# Patient Record
Sex: FEMALE | Race: WHITE | NOT HISPANIC OR LATINO
[De-identification: names, ages, dates, MRNs, and addresses within clinical notes are randomized per-mention and may not be internally consistent; named-entity substitution may affect disease eponyms.]

---

## 2017-10-30 ENCOUNTER — APPOINTMENT (OUTPATIENT)
Dept: ORTHOPEDIC SURGERY | Facility: CLINIC | Age: 67
End: 2017-10-30
Payer: MEDICARE

## 2017-10-30 VITALS — SYSTOLIC BLOOD PRESSURE: 152 MMHG | DIASTOLIC BLOOD PRESSURE: 81 MMHG | HEART RATE: 87 BPM

## 2017-10-30 PROCEDURE — 72100 X-RAY EXAM L-S SPINE 2/3 VWS: CPT

## 2017-10-30 PROCEDURE — 99214 OFFICE O/P EST MOD 30 MIN: CPT

## 2018-01-17 ENCOUNTER — RX RENEWAL (OUTPATIENT)
Age: 68
End: 2018-01-17

## 2018-01-17 RX ORDER — IBUPROFEN 800 MG/1
800 TABLET, FILM COATED ORAL 3 TIMES DAILY
Qty: 90 | Refills: 1 | Status: ACTIVE | COMMUNITY
Start: 2017-10-30 | End: 1900-01-01

## 2018-08-07 ENCOUNTER — TRANSCRIPTION ENCOUNTER (OUTPATIENT)
Age: 68
End: 2018-08-07

## 2018-08-10 ENCOUNTER — APPOINTMENT (OUTPATIENT)
Dept: UROLOGY | Facility: CLINIC | Age: 68
End: 2018-08-10
Payer: MEDICARE

## 2018-08-10 VITALS
RESPIRATION RATE: 17 BRPM | SYSTOLIC BLOOD PRESSURE: 131 MMHG | TEMPERATURE: 98 F | HEART RATE: 102 BPM | DIASTOLIC BLOOD PRESSURE: 82 MMHG

## 2018-08-10 VITALS — BODY MASS INDEX: 30.91 KG/M2 | WEIGHT: 168 LBS | HEIGHT: 62 IN

## 2018-08-10 DIAGNOSIS — Z87.440 PERSONAL HISTORY OF URINARY (TRACT) INFECTIONS: ICD-10-CM

## 2018-08-10 PROCEDURE — 99203 OFFICE O/P NEW LOW 30 MIN: CPT

## 2019-04-12 ENCOUNTER — TRANSCRIPTION ENCOUNTER (OUTPATIENT)
Age: 69
End: 2019-04-12

## 2019-08-01 ENCOUNTER — EMERGENCY (EMERGENCY)
Facility: HOSPITAL | Age: 69
LOS: 1 days | Discharge: HOME | End: 2019-08-01
Attending: EMERGENCY MEDICINE | Admitting: EMERGENCY MEDICINE
Payer: MEDICARE

## 2019-08-01 VITALS
TEMPERATURE: 96 F | OXYGEN SATURATION: 100 % | SYSTOLIC BLOOD PRESSURE: 149 MMHG | RESPIRATION RATE: 16 BRPM | HEART RATE: 98 BPM | DIASTOLIC BLOOD PRESSURE: 81 MMHG

## 2019-08-01 VITALS
OXYGEN SATURATION: 99 % | TEMPERATURE: 98 F | SYSTOLIC BLOOD PRESSURE: 147 MMHG | DIASTOLIC BLOOD PRESSURE: 67 MMHG | RESPIRATION RATE: 16 BRPM | HEART RATE: 86 BPM

## 2019-08-01 LAB
ALBUMIN SERPL ELPH-MCNC: 4.5 G/DL — SIGNIFICANT CHANGE UP (ref 3.3–5)
ALP SERPL-CCNC: 98 U/L — SIGNIFICANT CHANGE UP (ref 40–120)
ALT FLD-CCNC: 15 U/L — SIGNIFICANT CHANGE UP (ref 4–33)
ANION GAP SERPL CALC-SCNC: 14 MMO/L — SIGNIFICANT CHANGE UP (ref 7–14)
APPEARANCE UR: CLEAR — SIGNIFICANT CHANGE UP
AST SERPL-CCNC: 19 U/L — SIGNIFICANT CHANGE UP (ref 4–32)
BACTERIA # UR AUTO: HIGH
BASOPHILS # BLD AUTO: 0.06 K/UL — SIGNIFICANT CHANGE UP (ref 0–0.2)
BASOPHILS NFR BLD AUTO: 0.4 % — SIGNIFICANT CHANGE UP (ref 0–2)
BILIRUB SERPL-MCNC: 0.5 MG/DL — SIGNIFICANT CHANGE UP (ref 0.2–1.2)
BILIRUB UR-MCNC: NEGATIVE — SIGNIFICANT CHANGE UP
BLOOD UR QL VISUAL: HIGH
BUN SERPL-MCNC: 17 MG/DL — SIGNIFICANT CHANGE UP (ref 7–23)
CALCIUM SERPL-MCNC: 9.6 MG/DL — SIGNIFICANT CHANGE UP (ref 8.4–10.5)
CHLORIDE SERPL-SCNC: 106 MMOL/L — SIGNIFICANT CHANGE UP (ref 98–107)
CO2 SERPL-SCNC: 25 MMOL/L — SIGNIFICANT CHANGE UP (ref 22–31)
COLOR SPEC: SIGNIFICANT CHANGE UP
CREAT SERPL-MCNC: 0.78 MG/DL — SIGNIFICANT CHANGE UP (ref 0.5–1.3)
EOSINOPHIL # BLD AUTO: 0.15 K/UL — SIGNIFICANT CHANGE UP (ref 0–0.5)
EOSINOPHIL NFR BLD AUTO: 1 % — SIGNIFICANT CHANGE UP (ref 0–6)
GLUCOSE SERPL-MCNC: 166 MG/DL — HIGH (ref 70–99)
GLUCOSE UR-MCNC: NEGATIVE — SIGNIFICANT CHANGE UP
HCT VFR BLD CALC: 43.2 % — SIGNIFICANT CHANGE UP (ref 34.5–45)
HGB BLD-MCNC: 14.5 G/DL — SIGNIFICANT CHANGE UP (ref 11.5–15.5)
HYALINE CASTS # UR AUTO: NEGATIVE — SIGNIFICANT CHANGE UP
IMM GRANULOCYTES NFR BLD AUTO: 0.5 % — SIGNIFICANT CHANGE UP (ref 0–1.5)
KETONES UR-MCNC: NEGATIVE — SIGNIFICANT CHANGE UP
LEUKOCYTE ESTERASE UR-ACNC: SIGNIFICANT CHANGE UP
LIDOCAIN IGE QN: 34.3 U/L — SIGNIFICANT CHANGE UP (ref 7–60)
LYMPHOCYTES # BLD AUTO: 1.68 K/UL — SIGNIFICANT CHANGE UP (ref 1–3.3)
LYMPHOCYTES # BLD AUTO: 10.9 % — LOW (ref 13–44)
MCHC RBC-ENTMCNC: 29 PG — SIGNIFICANT CHANGE UP (ref 27–34)
MCHC RBC-ENTMCNC: 33.6 % — SIGNIFICANT CHANGE UP (ref 32–36)
MCV RBC AUTO: 86.4 FL — SIGNIFICANT CHANGE UP (ref 80–100)
MONOCYTES # BLD AUTO: 0.98 K/UL — HIGH (ref 0–0.9)
MONOCYTES NFR BLD AUTO: 6.4 % — SIGNIFICANT CHANGE UP (ref 2–14)
NEUTROPHILS # BLD AUTO: 12.41 K/UL — HIGH (ref 1.8–7.4)
NEUTROPHILS NFR BLD AUTO: 80.8 % — HIGH (ref 43–77)
NITRITE UR-MCNC: NEGATIVE — SIGNIFICANT CHANGE UP
NRBC # FLD: 0 K/UL — SIGNIFICANT CHANGE UP (ref 0–0)
PH UR: 7.5 — SIGNIFICANT CHANGE UP (ref 5–8)
PLATELET # BLD AUTO: 149 K/UL — LOW (ref 150–400)
PMV BLD: 11.7 FL — SIGNIFICANT CHANGE UP (ref 7–13)
POTASSIUM SERPL-MCNC: 4.2 MMOL/L — SIGNIFICANT CHANGE UP (ref 3.5–5.3)
POTASSIUM SERPL-SCNC: 4.2 MMOL/L — SIGNIFICANT CHANGE UP (ref 3.5–5.3)
PROT SERPL-MCNC: 7 G/DL — SIGNIFICANT CHANGE UP (ref 6–8.3)
PROT UR-MCNC: NEGATIVE — SIGNIFICANT CHANGE UP
RBC # BLD: 5 M/UL — SIGNIFICANT CHANGE UP (ref 3.8–5.2)
RBC # FLD: 12.6 % — SIGNIFICANT CHANGE UP (ref 10.3–14.5)
RBC CASTS # UR COMP ASSIST: >50 — HIGH (ref 0–?)
SODIUM SERPL-SCNC: 145 MMOL/L — SIGNIFICANT CHANGE UP (ref 135–145)
SP GR SPEC: 1.01 — SIGNIFICANT CHANGE UP (ref 1–1.04)
SQUAMOUS # UR AUTO: SIGNIFICANT CHANGE UP
UROBILINOGEN FLD QL: NORMAL — SIGNIFICANT CHANGE UP
WBC # BLD: 15.36 K/UL — HIGH (ref 3.8–10.5)
WBC # FLD AUTO: 15.36 K/UL — HIGH (ref 3.8–10.5)
WBC UR QL: HIGH (ref 0–?)

## 2019-08-01 PROCEDURE — 99285 EMERGENCY DEPT VISIT HI MDM: CPT

## 2019-08-01 PROCEDURE — 99282 EMERGENCY DEPT VISIT SF MDM: CPT

## 2019-08-01 PROCEDURE — 74177 CT ABD & PELVIS W/CONTRAST: CPT | Mod: 26

## 2019-08-01 RX ORDER — CEPHALEXIN 500 MG
1 CAPSULE ORAL
Qty: 14 | Refills: 0
Start: 2019-08-01 | End: 2019-08-07

## 2019-08-01 RX ORDER — KETOROLAC TROMETHAMINE 30 MG/ML
15 SYRINGE (ML) INJECTION ONCE
Refills: 0 | Status: DISCONTINUED | OUTPATIENT
Start: 2019-08-01 | End: 2019-08-01

## 2019-08-01 RX ORDER — TAMSULOSIN HYDROCHLORIDE 0.4 MG/1
1 CAPSULE ORAL
Qty: 7 | Refills: 0
Start: 2019-08-01 | End: 2019-08-07

## 2019-08-01 RX ORDER — SODIUM CHLORIDE 9 MG/ML
1000 INJECTION INTRAMUSCULAR; INTRAVENOUS; SUBCUTANEOUS ONCE
Refills: 0 | Status: COMPLETED | OUTPATIENT
Start: 2019-08-01 | End: 2019-08-01

## 2019-08-01 RX ORDER — MORPHINE SULFATE 50 MG/1
4 CAPSULE, EXTENDED RELEASE ORAL ONCE
Refills: 0 | Status: DISCONTINUED | OUTPATIENT
Start: 2019-08-01 | End: 2019-08-01

## 2019-08-01 RX ADMIN — MORPHINE SULFATE 4 MILLIGRAM(S): 50 CAPSULE, EXTENDED RELEASE ORAL at 16:48

## 2019-08-01 RX ADMIN — Medication 15 MILLIGRAM(S): at 16:49

## 2019-08-01 RX ADMIN — MORPHINE SULFATE 4 MILLIGRAM(S): 50 CAPSULE, EXTENDED RELEASE ORAL at 11:59

## 2019-08-01 RX ADMIN — SODIUM CHLORIDE 1000 MILLILITER(S): 9 INJECTION INTRAMUSCULAR; INTRAVENOUS; SUBCUTANEOUS at 11:59

## 2019-08-01 NOTE — ED PROVIDER NOTE - CLINICAL SUMMARY MEDICAL DECISION MAKING FREE TEXT BOX
see attending attestation see attending attestation  70 yo F with PMHX of kidney stone (distant past) HTN and HLD here with right mid abdominal pain/ right flank pain x 1 hour PTA. pt reports that she woke up this morning and felt well, she began to feel mild right sided pain and then it became more severe. Now 10/10 Reports chills, subjective fever.  plan r/o appy, vs cholecystitis, vs cholelithiasis vs pyelo, vs renal stone, pancreatitis  labs, lipase, ua, ucx, ct IV a/p, pain IVF, reassess.

## 2019-08-01 NOTE — ED PROVIDER NOTE - NSFOLLOWUPINSTRUCTIONS_ED_ALL_ED_FT
Follow up with your PMD within 48-72 hrs. Follow up with Urology this week.    Take Flomax 0.4mg daily.   Take Naproxen as directed.  Percocet 5/325 mg 1 tab every 6 hrs for break thru pain- caution drowsiness (do not drive or operate heavy machinery).    Take Keflex 500mg 1 tab 2 times a day.  Increase your fluid intake and continue to strain your urine. Any worsening pain, fever, chills, difficulty urinating, return to ED.    Please follow with Dr. Jordan with information provided.

## 2019-08-01 NOTE — ED PROVIDER NOTE - ATTENDING CONTRIBUTION TO CARE
Brian Mitchell MD: 68yo f pmh HTN, HLD p/w sudden onset flank pain since this AM.  No dysuria, frequency, or urgency. No n/v/d. No fevers. + chills.    GEN - NAD; well appearing; A+O x3   HEAD - NC/AT     EYES - EOMI, no conjunctival pallor, no scleral icterus  ENT -   mucous membranes  moist , no discharge      NECK - Neck supple  PULM - CTA b/l,  symmetric breath sounds  COR -  RRR, S1 S2, no murmurs  ABD - right mid abdomen/flank TTP  BACK - no CVA tenderness, nontender spine     EXTREMS - no edema, no deformity, warm and well perfused    SKIN - no rash or bruising      NEUROLOGIC - alert, sensation nl, motor 5/5 RUE/LUE/RLE/LLE     a/p: PT with sudden onset right flank/mid abdominal pain , will CT concern for possible appy vs pyelo vs stone, less likely AAA.

## 2019-08-01 NOTE — CONSULT NOTE ADULT - SUBJECTIVE AND OBJECTIVE BOX
HPI    69F with history of prior stone years ago, spontaneously passed. Presents with one day of right sided lower flank pain radiating to the bladder. Denies fever/chills, slight nausea, no vomiting.    PAST MEDICAL & SURGICAL HISTORY:  No pertinent past medical history  No significant past surgical history      MEDICATIONS  (STANDING):    MEDICATIONS  (PRN):      FAMILY HISTORY:      Allergies    No Known Allergies    Intolerances        SOCIAL HISTORY:    REVIEW OF SYSTEMS: Otherwise negative as stated in HPI    Physical Exam  Vital signs  T(C): 36.7 (19 @ 14:36), Max: 36.7 (19 @ 14:36)  HR: 86 (19 @ 14:36)  BP: 147/67 (19 @ 14:36)  SpO2: 99% (19 @ 14:36)  Wt(kg): --    Output      Gen:  NAD    Pulm:  No respiratory distress  	  CV:  RRR    GI:  S/ND/NT    :  no CVAT bilaterally      LABS:       @ 11:57    WBC 15.36 / Hct 43.2  / SCr 0.78         145  |  106  |  17  ----------------------------<  166<H>  4.2   |  25  |  0.78    Ca    9.6      01 Aug 2019 11:57    TPro  7.0  /  Alb  4.5  /  TBili  0.5  /  DBili  x   /  AST  19  /  ALT  15  /  AlkPhos  98        Urinalysis Basic - ( 01 Aug 2019 13:19 )    Color: LIGHT YELLOW / Appearance: CLEAR / S.011 / pH: 7.5  Gluc: NEGATIVE / Ketone: NEGATIVE  / Bili: NEGATIVE / Urobili: NORMAL   Blood: MODERATE / Protein: NEGATIVE / Nitrite: NEGATIVE   Leuk Esterase: LARGE / RBC: >50 / WBC 26-50   Sq Epi: OCC / Non Sq Epi: x / Bacteria: MODERATE        Urine Cx: pending  Blood Cx: N/A    RADIOLOGY:  < from: CT Abdomen and Pelvis w/ IV Cont (19 @ 14:53) >  EXAM:  CT ABDOMEN AND PELVIS IC        PROCEDURE DATE:  Aug  1 2019         INTERPRETATION:  CLINICAL INFORMATION: Right mid abdominal pain.     COMPARISON: None.    PROCEDURE:   CT of the Abdomen and Pelvis was performed with intravenous contrast.  Intravenous contrast: 90 ml Omnipaque 350. 10 ml discarded.  Oral contrast: None.  Sagittal and coronal reformats were performed.    FINDINGS:    LOWER CHEST: Dependent changes.    LIVER: Within normal limits.  BILE DUCTS: Normal caliber.  GALLBLADDER: Cholelithiasis.  SPLEEN: Within normal limits.  PANCREAS: Within normal limits.  ADRENALS: Within normal limits.  KIDNEYS/URETERS: Mild right hydroureteronephrosis and perinephric   stranding with uroepithelial enhancement secondary to an obstructing 2 mm   stone at the level of the right ureterovesicular junction. Duplicated   left collecting system.    BLADDER: Within normal limits.  REPRODUCTIVE ORGANS: Uterus and adnexa within normal limits.    BOWEL: No bowel obstruction. Appendix not visualized.  PERITONEUM: No ascites. No pneumoperitoneum.  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  RETROPERITONEUM/LYMPH NODES: No lymphadenopathy.    ABDOMINAL WALL: Within normal limits.  BONES: Status post L4-L5 posterior decompression and instrumented spinal   fusion with grade 1 anterolisthesis of L3 on L4 on a degenerative basis.    IMPRESSION:     Mild right hydroureteronephrosis secondary to a 2 mm nonobstructing stone   at the right ureterovesicular junction.    < end of copied text >
Consulting surgical team: C Team [id number]  Consulting attending: Dr. 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 The patient denies any previous pain before this episode. She has never seen a vascular surgeon and does not take any anticoagulation or anti platelets at home.      PAST MEDICAL HISTORY:  HTN  HLD  GERD      PAST SURGICAL HISTORY:  Spinal surgeries x3 for scoliosis   x2      MEDICATIONS:  Omeprazole  Enalapril  Statin      ALLERGIES:  No Known Allergies      VITALS & I/Os:  Vital Signs Last 24 Hrs  T(C): 36.7 (01 Aug 2019 14:36), Max: 36.7 (01 Aug 2019 14:36)  T(F): 98 (01 Aug 2019 14:36), Max: 98 (01 Aug 2019 14:36)  HR: 86 (01 Aug 2019 14:36) (86 - 98)  BP: 147/67 (01 Aug 2019 14:36) (147/67 - 149/81)  BP(mean): --  RR: 16 (01 Aug 2019 14:36) (16 - 16)  SpO2: 99% (01 Aug 2019 14:36) (99% - 100%)    PHYSICAL EXAM:  GEN: NAD, resting quietly  PULM: symmetric chest rise bilaterally, no increased WOB  CV: regular rate, peripheral pulses intact  ABD: soft, NTND  EXTR: no cyanosis or edema, moving all extremities      LABS:                        14.5   15.36 )-----------( 149      ( 01 Aug 2019 11:57 )             43.2         145  |  106  |  17  ----------------------------<  166<H>  4.2   |  25  |  0.78    Ca    9.6      01 Aug 2019 11:57    TPro  7.0  /  Alb  4.5  /  TBili  0.5  /  DBili  x   /  AST  19  /  ALT  15  /  AlkPhos  98  08    Urinalysis Basic - ( 01 Aug 2019 13:19 )    Color: LIGHT YELLOW / Appearance: CLEAR / S.011 / pH: 7.5  Gluc: NEGATIVE / Ketone: NEGATIVE  / Bili: NEGATIVE / Urobili: NORMAL   Blood: MODERATE / Protein: NEGATIVE / Nitrite: NEGATIVE   Leuk Esterase: LARGE / RBC: >50 / WBC 26-50   Sq Epi: OCC / Non Sq Epi: x / Bacteria: MODERATE        IMAGING:  < from: CT Abdomen and Pelvis w/ IV Cont (19 @ 14:53) >  BOWEL: No bowel obstruction. Appendix not visualized.  PERITONEUM: No ascites. No pneumoperitoneum.  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  RETROPERITONEUM/LYMPH NODES: No lymphadenopathy.    ABDOMINAL WALL: Within normal limits.  BONES: Status post L4-L5 posterior decompression and instrumented spinal   fusion with grade 1 anterolisthesis of L3 on L4 on a degenerative basis.    IMPRESSION:     Mild right hydroureteronephrosis secondary to a 2 mm nonobstructing stone   at the right ureterovesicular junction.    < end of copied text >

## 2019-08-01 NOTE — ED PROVIDER NOTE - OBJECTIVE STATEMENT
68 yo F with PMHX of kidney stone (distant past) HTN and HLD here with right mid abdominal pain/ right flank pain x 1 hour PTA. pt reports that she woke up this morning and felt well, she began to feel mild right sided pain and then it became more severe. Reports chills, subjective fever. 70 yo F with PMHX of kidney stone (distant past) HTN and HLD here with right mid abdominal pain/ right flank pain x 1 hour PTA. pt reports that she woke up this morning and felt well, she began to feel mild right sided pain and then it became more severe. Now 10/10 Reports chills, subjective fever. Denies nausea, vomiting, diarrhea, constipation, cp, sob, weakness, numbness, tingling, dysuria, hematuria, 70 yo F with PMHX of kidney stone (distant past) HTN and HLD here with right mid abdominal pain/ right flank pain x 1 hour PTA. pt reports that she woke up this morning and felt well, she began to feel mild right sided pain and then it became more severe. Now 10/10 Reports chills, subjective fever. Denies nausea, vomiting, diarrhea, constipation, cp, sob, weakness, numbness, tingling, dysuria, hematuria.     PSHx: , spinal surgery.

## 2019-08-01 NOTE — CONSULT NOTE ADULT - ATTENDING COMMENTS
small asymptomatic renal art aneurysm  no indication for intervention since aneurysm is smaller than 3cm   repeat imagining 1y

## 2019-08-01 NOTE — CONSULT NOTE ADULT - ASSESSMENT
69 year old female with 2mm nonobstructing distal stone.    - Recommend Urine culture  - Pain control  - Flomax  - Toradol/ibuprofen  - strain urine  - Follow up with Dr Villela in 1-2 weeks    Urology  [id number]
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    - no surgical intervention indicated  - dispo per ED  - patient may follow up as an outpatient with Dr. Jordan    Patient discussed with Dr. Fisher and Dr. Jackeline LONGO Team Surgery  [id number]

## 2019-08-01 NOTE — ED ADULT NURSE REASSESSMENT NOTE - NS ED NURSE REASSESS COMMENT FT1
pt returned from ct scan, appears uncomfortable, shivering in pain, denies n/v at this time, pt ct scan resulted, pt medicated as per MD orders,

## 2019-08-01 NOTE — ED PROVIDER NOTE - PROGRESS NOTE DETAILS
Pt seen by urology, no concern for septic stone. Pt after 2nd morphine and toradol. pain improved. Pt requesting to be discharge home.   vasc surg cs about renal artery thrombosis/ aneurysm. No acute intervention pt to follow in a month for US.

## 2019-08-01 NOTE — ED PROVIDER NOTE - CARE PROVIDER_API CALL
Bessy Jordan)  Surgery; Vascular Surgery  [address], [address]  [place] [address]  Phone: [phone]  Fax: [phone]  Follow Up Time:

## 2019-08-01 NOTE — ED PROVIDER NOTE - CARE PROVIDERS DIRECT ADDRESSES
[email or web address]

## 2019-08-01 NOTE — ED ADULT NURSE NOTE - OBJECTIVE STATEMENT
Pt rcvd to int 9 c/o sudden onset R flank pain radiating to R abd. Pain started apprx 1 hour ago. Hx: HLD. Denies any hx of kidney stones. Denies N/V/D, fever/chills, hematuria, dysuria. Appears uncomfortable. Pt is Aox4, ambulatory.

## 2019-08-02 PROBLEM — Z78.9 OTHER SPECIFIED HEALTH STATUS: Chronic | Status: ACTIVE | Noted: 2019-08-01

## 2019-08-02 LAB — SPECIMEN SOURCE: SIGNIFICANT CHANGE UP

## 2019-08-03 LAB — BACTERIA UR CULT: SIGNIFICANT CHANGE UP

## 2019-08-04 NOTE — ED POST DISCHARGE NOTE - RESULT SUMMARY
culture grew 3 or more types of organisms  which indicate collection contamination, consider recollection only if clinically indicated. Patient with a HX of kidney stones. Patient contact # [phone]. Discussed with patient needs to follow up with MD for repeat UA/UCX. Discussed with patient need to return to ED if symptoms don't continue to improve or recur or develops any new or worsening symptoms that are of concern.

## 2019-08-07 LAB
APPEARANCE: CLEAR
BACTERIA UR CULT: NORMAL
BACTERIA: NEGATIVE
BILIRUBIN URINE: NEGATIVE
BLOOD URINE: ABNORMAL
COLOR: YELLOW
GLUCOSE QUALITATIVE U: NEGATIVE
HYALINE CASTS: 3 /LPF
KETONES URINE: NEGATIVE
LEUKOCYTE ESTERASE URINE: NEGATIVE
MICROSCOPIC-UA: NORMAL
NITRITE URINE: NEGATIVE
PH URINE: 6
PROTEIN URINE: ABNORMAL
RED BLOOD CELLS URINE: 75 /HPF
SPECIFIC GRAVITY URINE: 1.02
SQUAMOUS EPITHELIAL CELLS: 8 /HPF
UROBILINOGEN URINE: NORMAL
WHITE BLOOD CELLS URINE: 5 /HPF

## 2019-08-09 ENCOUNTER — APPOINTMENT (OUTPATIENT)
Dept: UROLOGY | Facility: CLINIC | Age: 69
End: 2019-08-09
Payer: MEDICARE

## 2019-08-09 PROCEDURE — 99214 OFFICE O/P EST MOD 30 MIN: CPT

## 2019-08-09 NOTE — ASSESSMENT
[FreeTextEntry1] : \par --medical expulsive therapy with flomax\par --pain control with percocet and IBU\par --stone precautions\par --encourage fluid intake\par --Renal US in 1mo to eval for passage\par --RTC in 1mo

## 2019-08-09 NOTE — HISTORY OF PRESENT ILLNESS
[FreeTextEntry1] : 69 year old F with cc of R ureteral stone. Pt reports that she developed R sided pain acutely. She endorses nausea and emesis. She went to ER and had CT which showed 2mm stone at R UVJ. She was discharged with flomax, percocet and keflex. UCx was contaminated. This was repeated and was negative. \par \par Had one stone in the past 10-15y ago that passed on its own.No family hx of stones. \par Drinks flavored water and 8oz of coffee. No special diet. \par \par Review of CT shows 2mm at R UVJ with mild hydro. No stones seen in kidneys.

## 2019-09-16 ENCOUNTER — APPOINTMENT (OUTPATIENT)
Dept: UROLOGY | Facility: CLINIC | Age: 69
End: 2019-09-16

## 2020-01-31 ENCOUNTER — APPOINTMENT (OUTPATIENT)
Dept: UROLOGY | Facility: CLINIC | Age: 70
End: 2020-01-31
Payer: MEDICARE

## 2020-01-31 DIAGNOSIS — N20.1 CALCULUS OF URETER: ICD-10-CM

## 2020-01-31 PROCEDURE — 99213 OFFICE O/P EST LOW 20 MIN: CPT

## 2020-01-31 RX ORDER — NAPROXEN 500 MG/1
500 TABLET ORAL
Qty: 180 | Refills: 2 | Status: ACTIVE | COMMUNITY
Start: 2020-01-31 | End: 1900-01-01

## 2020-01-31 NOTE — PHYSICAL EXAM
[General Appearance - Well Nourished] : well nourished [General Appearance - Well Developed] : well developed [General Appearance - In No Acute Distress] : no acute distress [Abdomen Soft] : soft [Abdomen Tenderness] : non-tender [Costovertebral Angle Tenderness] : no ~M costovertebral angle tenderness [Edema] : no peripheral edema [Exaggerated Use Of Accessory Muscles For Inspiration] : no accessory muscle use [Oriented To Time, Place, And Person] : oriented to person, place, and time [Normal Station and Gait] : the gait and station were normal for the patient's age

## 2020-02-04 PROBLEM — N20.1 URETERAL STONE: Status: ACTIVE | Noted: 2019-08-09

## 2020-02-04 NOTE — HISTORY OF PRESENT ILLNESS
[FreeTextEntry1] : 69 year old F with cc of R ureteral stone. Pt seen in Aug after she developed R sided pain acutely. She had nausea and emesis. She went to ER and had CT which showed 2mm stone at R UVJ. She was discharged with flomax, percocet and keflex. UCx was contaminated. This was repeated and was negative. Had one stone in the past 10-15y ago that passed on its own.No family hx of stones. \par Drinks flavored water and 8oz of coffee. No special diet. Review of CT shows 2mm at R UVJ with mild hydro. No stones seen in kidneys. Plan made for US. SHe did not do this until recently. \par \par She reports that she developed pain on R lower back pain that she stood and felt like she had difficulty moving. She had radiation of pain up her back into R. Had difficulty with shortness of breath. Had a CTPA with her primary that was negative. Now has radiation to L side. Got US that was negative.

## 2020-02-04 NOTE — ASSESSMENT
[FreeTextEntry1] : Diffuse intermittent back pain. Suspect MSK. Stone passed on US\par --Consider renal US for stone eval in 1-2y

## 2020-04-29 NOTE — ED PROVIDER NOTE - GASTROINTESTINAL [+], MLM
Pt called wanted to know if he needs to be fasting for his appt on Friday? He says appt is  pre op and a follow up for cholesterol and high blood pressure. right mid flank/ abominal pain

## 2020-06-05 ENCOUNTER — APPOINTMENT (OUTPATIENT)
Dept: UROLOGY | Facility: CLINIC | Age: 70
End: 2020-06-05
Payer: MEDICARE

## 2020-06-05 VITALS
HEART RATE: 106 BPM | DIASTOLIC BLOOD PRESSURE: 79 MMHG | RESPIRATION RATE: 17 BRPM | BODY MASS INDEX: 27.6 KG/M2 | TEMPERATURE: 97.9 F | HEIGHT: 62 IN | WEIGHT: 150 LBS | SYSTOLIC BLOOD PRESSURE: 109 MMHG

## 2020-06-05 DIAGNOSIS — N39.0 URINARY TRACT INFECTION, SITE NOT SPECIFIED: ICD-10-CM

## 2020-06-05 LAB
APPEARANCE: CLEAR
BACTERIA UR CULT: NORMAL
BACTERIA: NEGATIVE
BILIRUBIN URINE: NEGATIVE
BLOOD URINE: NEGATIVE
COLOR: YELLOW
GLUCOSE QUALITATIVE U: NEGATIVE
HYALINE CASTS: 0 /LPF
KETONES URINE: NEGATIVE
LEUKOCYTE ESTERASE URINE: NEGATIVE
MICROSCOPIC-UA: NORMAL
NITRITE URINE: NEGATIVE
PH URINE: 6.5
PROTEIN URINE: NORMAL
RED BLOOD CELLS URINE: 2 /HPF
SPECIFIC GRAVITY URINE: 1.03
SQUAMOUS EPITHELIAL CELLS: 3 /HPF
UROBILINOGEN URINE: NORMAL
WHITE BLOOD CELLS URINE: 8 /HPF

## 2020-06-05 PROCEDURE — 99213 OFFICE O/P EST LOW 20 MIN: CPT

## 2020-06-05 RX ORDER — AMOXICILLIN 500 MG/1
500 CAPSULE ORAL 3 TIMES DAILY
Qty: 15 | Refills: 0 | Status: ACTIVE | COMMUNITY
Start: 2020-06-05 | End: 1900-01-01

## 2020-06-05 RX ORDER — PHENAZOPYRIDINE HYDROCHLORIDE 200 MG/1
200 TABLET ORAL 3 TIMES DAILY
Qty: 21 | Refills: 0 | Status: ACTIVE | COMMUNITY
Start: 2020-06-05 | End: 1900-01-01

## 2020-06-05 NOTE — HISTORY OF PRESENT ILLNESS
[FreeTextEntry1] : 70 year old F with cc of UTI. Pt with acute change in urinary sx with frequency and urgency and hematuria. SHe took amox that she had at home. she had some resolution of sx but is till having some lingering sx. She gave a urine and this was negative. \par \par Pt also with hx of R ureteral stone. Pt seen in Aug after she developed R sided pain acutely. She had nausea and emesis. She went to ER and had CT which showed 2mm stone at R UVJ. She was discharged with flomax, percocet and keflex. UCx was contaminated. This was repeated and was negative. Had one stone in the past 10-15y ago that passed on its own.No family hx of stones. \par Drinks flavored water and 8oz of coffee. No special diet. Review of CT shows 2mm at R UVJ with mild hydro. No stones seen in kidneys. Plan made for US. SHe did not do this until recently. She reports that she developed pain on R lower back pain that she stood and felt like she had difficulty moving. She had radiation of pain up her back into R. Had difficulty with shortness of breath. Had a CTPA with her primary that was negative. Now has radiation to L side. Got US that was negative.

## 2020-06-05 NOTE — ASSESSMENT
[FreeTextEntry1] : Suspect acute UTI now with lingering sx. \par --Pyridium x1 week\par --UA, UCx when sx\par \par Diffuse intermittent back pain. Suspect MSK. Stone passed on US\par --Consider renal US for stone eval in 1-2y

## 2020-07-24 ENCOUNTER — APPOINTMENT (OUTPATIENT)
Dept: INTERNAL MEDICINE | Facility: CLINIC | Age: 70
End: 2020-07-24

## 2020-09-14 ENCOUNTER — APPOINTMENT (OUTPATIENT)
Dept: OTOLARYNGOLOGY | Facility: CLINIC | Age: 70
End: 2020-09-14

## 2020-09-28 ENCOUNTER — APPOINTMENT (OUTPATIENT)
Dept: UROLOGY | Facility: CLINIC | Age: 70
End: 2020-09-28
Payer: MEDICARE

## 2020-09-28 DIAGNOSIS — M54.5 LOW BACK PAIN: ICD-10-CM

## 2020-09-28 PROCEDURE — 99213 OFFICE O/P EST LOW 20 MIN: CPT

## 2020-09-28 NOTE — ASSESSMENT
[FreeTextEntry1] : No  pathology on CT from 3mo ago. Pt with intermittent pain. Has known significant lumbar hx and per pt, PCP also thought this was MSK in nature. Discouraged use of abx for pain. This may have ace antiinflammatory benefit but is not treating an infection and may cause harm with use. \par --F/u with ortho

## 2020-09-28 NOTE — HISTORY OF PRESENT ILLNESS
[FreeTextEntry1] : 70 year old F with cc of UTI and R . Pt seen for urinary sx and stones in the past. Last seen in Aug. Her last urine was negative. She has hx of R ureteral stone. Pt seen in Aug 2019 after she developed R sided pain acutely. She went to ER and had CT which showed 2mm stone at R UVJ. She was discharged with flomax, percocet and keflex. UCx was contaminated. This was repeated and was negative. Had one stone in the past 10-15y ago that passed on its own. No family hx of stones. She had CT again this year that was negative. SHe comes in today with complaints of intermittent R flank pain. Pain is worse with movement. SHe takes penicillin on her own and reports that pain improves. No fever or chills. No change in urinary sx. \par

## 2020-12-23 PROBLEM — N39.0 ACUTE UTI: Status: RESOLVED | Noted: 2020-06-05 | Resolved: 2020-12-23

## 2021-07-19 ENCOUNTER — APPOINTMENT (OUTPATIENT)
Dept: MAMMOGRAPHY | Facility: IMAGING CENTER | Age: 71
End: 2021-07-19
Payer: MEDICARE

## 2021-07-19 ENCOUNTER — OUTPATIENT (OUTPATIENT)
Dept: OUTPATIENT SERVICES | Facility: HOSPITAL | Age: 71
LOS: 1 days | End: 2021-07-19
Payer: MEDICARE

## 2021-07-19 DIAGNOSIS — Z00.8 ENCOUNTER FOR OTHER GENERAL EXAMINATION: ICD-10-CM

## 2021-07-19 PROCEDURE — 77063 BREAST TOMOSYNTHESIS BI: CPT | Mod: 26

## 2021-07-19 PROCEDURE — 77067 SCR MAMMO BI INCL CAD: CPT

## 2021-07-19 PROCEDURE — 77067 SCR MAMMO BI INCL CAD: CPT | Mod: 26

## 2021-07-19 PROCEDURE — 77063 BREAST TOMOSYNTHESIS BI: CPT

## 2021-08-19 ENCOUNTER — APPOINTMENT (OUTPATIENT)
Dept: ULTRASOUND IMAGING | Facility: IMAGING CENTER | Age: 71
End: 2021-08-19
Payer: MEDICARE

## 2021-08-19 ENCOUNTER — OUTPATIENT (OUTPATIENT)
Dept: OUTPATIENT SERVICES | Facility: HOSPITAL | Age: 71
LOS: 1 days | End: 2021-08-19
Payer: MEDICARE

## 2021-08-19 ENCOUNTER — APPOINTMENT (OUTPATIENT)
Dept: MAMMOGRAPHY | Facility: IMAGING CENTER | Age: 71
End: 2021-08-19
Payer: MEDICARE

## 2021-08-19 DIAGNOSIS — Z00.8 ENCOUNTER FOR OTHER GENERAL EXAMINATION: ICD-10-CM

## 2021-08-19 PROCEDURE — G0279: CPT

## 2021-08-19 PROCEDURE — 76641 ULTRASOUND BREAST COMPLETE: CPT | Mod: 26,50

## 2021-08-19 PROCEDURE — 77066 DX MAMMO INCL CAD BI: CPT

## 2021-08-19 PROCEDURE — G0279: CPT | Mod: 26

## 2021-08-19 PROCEDURE — 77066 DX MAMMO INCL CAD BI: CPT | Mod: 26

## 2021-08-19 PROCEDURE — 76641 ULTRASOUND BREAST COMPLETE: CPT

## 2022-04-05 ENCOUNTER — APPOINTMENT (OUTPATIENT)
Dept: MAMMOGRAPHY | Facility: IMAGING CENTER | Age: 72
End: 2022-04-05
Payer: MEDICARE

## 2022-04-05 ENCOUNTER — OUTPATIENT (OUTPATIENT)
Dept: OUTPATIENT SERVICES | Facility: HOSPITAL | Age: 72
LOS: 1 days | End: 2022-04-05
Payer: MEDICARE

## 2022-04-05 ENCOUNTER — APPOINTMENT (OUTPATIENT)
Dept: ULTRASOUND IMAGING | Facility: IMAGING CENTER | Age: 72
End: 2022-04-05
Payer: MEDICARE

## 2022-04-05 DIAGNOSIS — Z00.8 ENCOUNTER FOR OTHER GENERAL EXAMINATION: ICD-10-CM

## 2022-04-05 PROCEDURE — 77066 DX MAMMO INCL CAD BI: CPT | Mod: 26

## 2022-04-05 PROCEDURE — G0279: CPT | Mod: 26

## 2022-04-05 PROCEDURE — G0279: CPT

## 2022-04-05 PROCEDURE — 77066 DX MAMMO INCL CAD BI: CPT

## 2022-04-05 PROCEDURE — 76642 ULTRASOUND BREAST LIMITED: CPT | Mod: 26,50

## 2022-04-05 PROCEDURE — 76642 ULTRASOUND BREAST LIMITED: CPT

## 2022-10-06 ENCOUNTER — OUTPATIENT (OUTPATIENT)
Dept: OUTPATIENT SERVICES | Facility: HOSPITAL | Age: 72
LOS: 1 days | End: 2022-10-06
Payer: MEDICARE

## 2022-10-06 ENCOUNTER — APPOINTMENT (OUTPATIENT)
Dept: ULTRASOUND IMAGING | Facility: IMAGING CENTER | Age: 72
End: 2022-10-06

## 2022-10-06 ENCOUNTER — APPOINTMENT (OUTPATIENT)
Dept: MAMMOGRAPHY | Facility: IMAGING CENTER | Age: 72
End: 2022-10-06

## 2022-10-06 DIAGNOSIS — Z00.8 ENCOUNTER FOR OTHER GENERAL EXAMINATION: ICD-10-CM

## 2022-10-06 PROCEDURE — G0279: CPT

## 2022-10-06 PROCEDURE — 77066 DX MAMMO INCL CAD BI: CPT | Mod: 26

## 2022-10-06 PROCEDURE — G0279: CPT | Mod: 26

## 2022-10-06 PROCEDURE — 77066 DX MAMMO INCL CAD BI: CPT

## 2023-07-26 ENCOUNTER — APPOINTMENT (OUTPATIENT)
Dept: RADIOLOGY | Facility: IMAGING CENTER | Age: 73
End: 2023-07-26
Payer: MEDICARE

## 2023-07-26 ENCOUNTER — OUTPATIENT (OUTPATIENT)
Dept: OUTPATIENT SERVICES | Facility: HOSPITAL | Age: 73
LOS: 1 days | End: 2023-07-26
Payer: MEDICARE

## 2023-07-26 DIAGNOSIS — Z00.8 ENCOUNTER FOR OTHER GENERAL EXAMINATION: ICD-10-CM

## 2023-07-26 PROCEDURE — 73630 X-RAY EXAM OF FOOT: CPT | Mod: 26,RT

## 2023-07-26 PROCEDURE — 73630 X-RAY EXAM OF FOOT: CPT

## 2024-07-31 ENCOUNTER — APPOINTMENT (OUTPATIENT)
Dept: UROLOGY | Facility: CLINIC | Age: 74
End: 2024-07-31

## 2024-07-31 VITALS
WEIGHT: 169 LBS | HEART RATE: 80 BPM | RESPIRATION RATE: 17 BRPM | SYSTOLIC BLOOD PRESSURE: 145 MMHG | DIASTOLIC BLOOD PRESSURE: 72 MMHG | BODY MASS INDEX: 31.1 KG/M2 | HEIGHT: 62 IN

## 2024-07-31 DIAGNOSIS — Z98.1 ARTHRODESIS STATUS: ICD-10-CM

## 2024-07-31 DIAGNOSIS — K21.9 GASTRO-ESOPHAGEAL REFLUX DISEASE W/OUT ESOPHAGITIS: ICD-10-CM

## 2024-07-31 DIAGNOSIS — R29.898 OTHER SYMPTOMS AND SIGNS INVOLVING THE MUSCULOSKELETAL SYSTEM: ICD-10-CM

## 2024-07-31 DIAGNOSIS — R39.89 OTHER SYMPTOMS AND SIGNS INVOLVING THE GENITOURINARY SYSTEM: ICD-10-CM

## 2024-07-31 DIAGNOSIS — R39.13 SPLITTING OF URINARY STREAM: ICD-10-CM

## 2024-07-31 DIAGNOSIS — R35.0 FREQUENCY OF MICTURITION: ICD-10-CM

## 2024-07-31 DIAGNOSIS — N20.0 CALCULUS OF KIDNEY: ICD-10-CM

## 2024-07-31 PROCEDURE — 99204 OFFICE O/P NEW MOD 45 MIN: CPT | Mod: 25

## 2024-07-31 PROCEDURE — 51798 US URINE CAPACITY MEASURE: CPT

## 2024-07-31 RX ORDER — ROSUVASTATIN CALCIUM 20 MG/1
20 TABLET, FILM COATED ORAL
Refills: 0 | Status: ACTIVE | COMMUNITY

## 2024-07-31 RX ORDER — AMOXICILLIN 500 MG/1
500 TABLET, FILM COATED ORAL
Qty: 36 | Refills: 1 | Status: ACTIVE | COMMUNITY
Start: 2024-07-31 | End: 1900-01-01

## 2024-07-31 RX ORDER — AMLODIPINE BESYLATE AND BENAZEPRIL HYDROCHLORIDE 5; 20 MG/1; MG/1
5-20 CAPSULE ORAL
Refills: 0 | Status: ACTIVE | COMMUNITY

## 2024-07-31 RX ORDER — ESOMEPRAZOLE MAGNESIUM 40 MG/1
40 CAPSULE, DELAYED RELEASE ORAL
Refills: 0 | Status: ACTIVE | COMMUNITY

## 2024-08-01 LAB
APPEARANCE: CLEAR
BILIRUBIN URINE: NEGATIVE
BLOOD URINE: NEGATIVE
COLOR: YELLOW
GLUCOSE QUALITATIVE U: NEGATIVE MG/DL
KETONES URINE: NEGATIVE MG/DL
LEUKOCYTE ESTERASE URINE: NEGATIVE
NITRITE URINE: NEGATIVE
PH URINE: 6
PROTEIN URINE: NORMAL MG/DL
SPECIFIC GRAVITY URINE: 1.02
UROBILINOGEN URINE: 0.2 MG/DL

## 2024-08-04 NOTE — HISTORY OF PRESENT ILLNESS
[FreeTextEntry1] : JENNIFER CASTRO is a 74 year old F who presents with concerns regarding a slit stream or "two flows": voids w no hesitation and no intermittency. She goes with a reasonable flow, but it stops and then goes again. Can't just "get up". Does not strain to void and does not have chronic constipation.  Has had no episodes of AUR requiring muñiz.  Feels empty. Does not double void.  No h/o GH and no issue w febrile UTI's. Had stones x 2, decades ago: no recent colic or n/v.  Denies incontinence.  No urgency and on smoking history.  M passed at 89 D passed at 88

## 2024-08-04 NOTE — ADDENDUM
[FreeTextEntry1] : This note was partly authored by Lan Smith working as a scribe for TOMER Banegas. I, TOMER Banegas, have reviewed the content of this note and confirm it is true and accurate. I personally performed the history and physical examination and made all the decisions. 07/31/2024.

## 2024-08-04 NOTE — REVIEW OF SYSTEMS
[Negative] : Endocrine [Joint Pain] : joint pain [Joint Swelling] : joint swelling [Difficulty Walking] : difficulty walking [Easy Bruising] : a tendency for easy bruising [Incontinence] : incontinence [Leakage of urine with straining, coughing, laughing] : leakage of urine with straining, coughing, laughing [Constipation] : no constipation [Dizziness] : no dizziness [Limb Weakness] : no limb weakness

## 2024-08-08 PROBLEM — Z98.1 H/O SPINAL FUSION: Status: ACTIVE | Noted: 2024-07-31

## 2024-08-08 PROBLEM — R39.13 SPLIT URINARY STREAM: Status: ACTIVE | Noted: 2024-08-08

## 2024-08-08 PROBLEM — R29.898 TMJ CLICK: Status: ACTIVE | Noted: 2024-07-31

## 2024-08-08 PROBLEM — R39.89 SENSATION OF PRESSURE IN BLADDER AREA: Status: ACTIVE | Noted: 2024-08-08

## 2025-04-16 ENCOUNTER — APPOINTMENT (OUTPATIENT)
Dept: UROLOGY | Facility: CLINIC | Age: 75
End: 2025-04-16
Payer: MEDICARE

## 2025-04-16 VITALS — DIASTOLIC BLOOD PRESSURE: 71 MMHG | SYSTOLIC BLOOD PRESSURE: 109 MMHG | HEART RATE: 94 BPM

## 2025-04-16 DIAGNOSIS — R39.9 UNSPECIFIED SYMPTOMS AND SIGNS INVOLVING THE GENITOURINARY SYSTEM: ICD-10-CM

## 2025-04-16 DIAGNOSIS — R10.9 UNSPECIFIED ABDOMINAL PAIN: ICD-10-CM

## 2025-04-16 DIAGNOSIS — M79.89 OTHER SPECIFIED SOFT TISSUE DISORDERS: ICD-10-CM

## 2025-04-16 DIAGNOSIS — R35.0 FREQUENCY OF MICTURITION: ICD-10-CM

## 2025-04-16 DIAGNOSIS — N20.0 CALCULUS OF KIDNEY: ICD-10-CM

## 2025-04-16 DIAGNOSIS — R39.15 URGENCY OF URINATION: ICD-10-CM

## 2025-04-16 PROCEDURE — 99214 OFFICE O/P EST MOD 30 MIN: CPT

## 2025-04-17 LAB
APPEARANCE: CLEAR
BACTERIA: NEGATIVE /HPF
BILIRUBIN URINE: NEGATIVE
BLOOD URINE: NEGATIVE
CAST: 0 /LPF
COLOR: YELLOW
EPITHELIAL CELLS: 1 /HPF
GLUCOSE QUALITATIVE U: NEGATIVE MG/DL
KETONES URINE: NEGATIVE MG/DL
LEUKOCYTE ESTERASE URINE: ABNORMAL
MICROSCOPIC-UA: NORMAL
NITRITE URINE: NEGATIVE
PH URINE: 7.5
PROTEIN URINE: NEGATIVE MG/DL
RED BLOOD CELLS URINE: 2 /HPF
SPECIFIC GRAVITY URINE: 1.02
UROBILINOGEN URINE: 1 MG/DL
WHITE BLOOD CELLS URINE: 0 /HPF

## 2025-04-23 ENCOUNTER — OUTPATIENT (OUTPATIENT)
Dept: OUTPATIENT SERVICES | Facility: HOSPITAL | Age: 75
LOS: 1 days | End: 2025-04-23
Payer: MEDICARE

## 2025-04-23 ENCOUNTER — APPOINTMENT (OUTPATIENT)
Dept: CT IMAGING | Facility: IMAGING CENTER | Age: 75
End: 2025-04-23
Payer: MEDICARE

## 2025-04-23 DIAGNOSIS — N20.0 CALCULUS OF KIDNEY: ICD-10-CM

## 2025-04-23 DIAGNOSIS — Z00.8 ENCOUNTER FOR OTHER GENERAL EXAMINATION: ICD-10-CM

## 2025-04-23 PROCEDURE — 74176 CT ABD & PELVIS W/O CONTRAST: CPT | Mod: 26

## 2025-04-23 PROCEDURE — 74176 CT ABD & PELVIS W/O CONTRAST: CPT

## 2025-04-24 DIAGNOSIS — A49.1 STREPTOCOCCAL INFECTION, UNSPECIFIED SITE: ICD-10-CM

## 2025-04-24 RX ORDER — AMPICILLIN 500 MG/1
500 CAPSULE ORAL 4 TIMES DAILY
Qty: 20 | Refills: 0 | Status: ACTIVE | COMMUNITY
Start: 2025-04-24 | End: 1900-01-01

## 2025-04-24 RX ORDER — CEFPODOXIME PROXETIL 200 MG/1
200 TABLET, FILM COATED ORAL
Qty: 10 | Refills: 0 | Status: DISCONTINUED | COMMUNITY
Start: 2025-04-24 | End: 2025-04-24

## 2025-04-29 ENCOUNTER — OUTPATIENT (OUTPATIENT)
Dept: OUTPATIENT SERVICES | Facility: HOSPITAL | Age: 75
LOS: 1 days | End: 2025-04-29
Payer: MEDICARE

## 2025-04-29 ENCOUNTER — APPOINTMENT (OUTPATIENT)
Dept: RADIOLOGY | Facility: IMAGING CENTER | Age: 75
End: 2025-04-29
Payer: MEDICARE

## 2025-04-29 DIAGNOSIS — Z00.8 ENCOUNTER FOR OTHER GENERAL EXAMINATION: ICD-10-CM

## 2025-04-29 PROCEDURE — 73620 X-RAY EXAM OF FOOT: CPT | Mod: 26,LT

## 2025-04-29 PROCEDURE — 73620 X-RAY EXAM OF FOOT: CPT

## 2025-05-02 ENCOUNTER — NON-APPOINTMENT (OUTPATIENT)
Age: 75
End: 2025-05-02

## 2025-05-02 ENCOUNTER — APPOINTMENT (OUTPATIENT)
Dept: VASCULAR SURGERY | Facility: CLINIC | Age: 75
End: 2025-05-02
Payer: MEDICARE

## 2025-05-02 ENCOUNTER — APPOINTMENT (OUTPATIENT)
Dept: VASCULAR SURGERY | Facility: CLINIC | Age: 75
End: 2025-05-02

## 2025-05-02 VITALS
HEART RATE: 76 BPM | TEMPERATURE: 97.5 F | BODY MASS INDEX: 32.02 KG/M2 | DIASTOLIC BLOOD PRESSURE: 76 MMHG | SYSTOLIC BLOOD PRESSURE: 143 MMHG | RESPIRATION RATE: 16 BRPM | WEIGHT: 174 LBS | HEIGHT: 62 IN

## 2025-05-02 VITALS — SYSTOLIC BLOOD PRESSURE: 157 MMHG | DIASTOLIC BLOOD PRESSURE: 76 MMHG | HEART RATE: 85 BPM

## 2025-05-02 PROCEDURE — 93970 EXTREMITY STUDY: CPT

## 2025-05-02 PROCEDURE — 99204 OFFICE O/P NEW MOD 45 MIN: CPT

## 2025-05-02 RX ORDER — ROSUVASTATIN CALCIUM 5 MG/1
TABLET, FILM COATED ORAL
Refills: 0 | Status: ACTIVE | COMMUNITY

## 2025-05-12 ENCOUNTER — NON-APPOINTMENT (OUTPATIENT)
Age: 75
End: 2025-05-12

## 2025-05-12 ENCOUNTER — APPOINTMENT (OUTPATIENT)
Dept: UROLOGY | Facility: CLINIC | Age: 75
End: 2025-05-12
Payer: MEDICARE

## 2025-05-12 VITALS
SYSTOLIC BLOOD PRESSURE: 125 MMHG | HEART RATE: 87 BPM | OXYGEN SATURATION: 96 % | WEIGHT: 170 LBS | DIASTOLIC BLOOD PRESSURE: 84 MMHG | HEIGHT: 62 IN | RESPIRATION RATE: 16 BRPM | BODY MASS INDEX: 31.28 KG/M2

## 2025-05-12 DIAGNOSIS — N20.0 CALCULUS OF KIDNEY: ICD-10-CM

## 2025-05-12 DIAGNOSIS — R39.9 UNSPECIFIED SYMPTOMS AND SIGNS INVOLVING THE GENITOURINARY SYSTEM: ICD-10-CM

## 2025-05-12 DIAGNOSIS — R39.15 URGENCY OF URINATION: ICD-10-CM

## 2025-05-12 PROCEDURE — 99214 OFFICE O/P EST MOD 30 MIN: CPT

## 2025-07-16 ENCOUNTER — APPOINTMENT (OUTPATIENT)
Dept: UROLOGY | Facility: CLINIC | Age: 75
End: 2025-07-16
Payer: MEDICARE

## 2025-07-16 VITALS
DIASTOLIC BLOOD PRESSURE: 73 MMHG | WEIGHT: 173 LBS | HEIGHT: 62 IN | RESPIRATION RATE: 16 BRPM | OXYGEN SATURATION: 93 % | TEMPERATURE: 97.6 F | SYSTOLIC BLOOD PRESSURE: 122 MMHG | HEART RATE: 80 BPM | BODY MASS INDEX: 31.83 KG/M2

## 2025-07-16 PROCEDURE — 99213 OFFICE O/P EST LOW 20 MIN: CPT
